# Patient Record
Sex: MALE | Race: WHITE | Employment: OTHER | URBAN - NONMETROPOLITAN AREA
[De-identification: names, ages, dates, MRNs, and addresses within clinical notes are randomized per-mention and may not be internally consistent; named-entity substitution may affect disease eponyms.]

---

## 2023-10-17 ENCOUNTER — TELEPHONE (OUTPATIENT)
Dept: CARDIOLOGY | Facility: CLINIC | Age: 88
End: 2023-10-17

## 2023-10-17 PROBLEM — Z79.01 ANTICOAGULATED: Status: ACTIVE | Noted: 2023-10-17

## 2023-10-17 PROBLEM — R94.39 ABNORMAL STRESS TEST: Status: ACTIVE | Noted: 2023-10-17

## 2023-10-17 PROBLEM — R06.00 DYSPNEA: Status: ACTIVE | Noted: 2023-10-17

## 2023-10-17 PROBLEM — I10 ESSENTIAL HYPERTENSION: Status: ACTIVE | Noted: 2023-10-17

## 2023-10-17 PROBLEM — D49.2 BONE TUMOR: Status: ACTIVE | Noted: 2023-10-17

## 2023-10-17 PROBLEM — G45.9 TIA (TRANSIENT ISCHEMIC ATTACK): Status: ACTIVE | Noted: 2023-10-17

## 2023-10-17 PROBLEM — I95.9 HYPOTENSION: Status: ACTIVE | Noted: 2023-10-17

## 2023-10-17 PROBLEM — I48.19 PERSISTENT ATRIAL FIBRILLATION (MULTI): Status: ACTIVE | Noted: 2023-10-17

## 2023-10-17 PROBLEM — E78.5 HYPERLIPIDEMIA: Status: ACTIVE | Noted: 2023-10-17

## 2023-10-17 RX ORDER — ASPIRIN 81 MG/1
81 TABLET ORAL DAILY
COMMUNITY

## 2023-10-17 RX ORDER — FUROSEMIDE 40 MG/1
1 TABLET ORAL DAILY
COMMUNITY
Start: 2022-07-15

## 2023-10-17 RX ORDER — BENAZEPRIL HYDROCHLORIDE 20 MG/1
0.5 TABLET ORAL DAILY
COMMUNITY

## 2023-10-17 RX ORDER — ASPIRIN 81 MG
1 TABLET, DELAYED RELEASE (ENTERIC COATED) ORAL DAILY
COMMUNITY

## 2023-10-17 RX ORDER — METOPROLOL SUCCINATE 25 MG/1
25 TABLET, EXTENDED RELEASE ORAL DAILY
COMMUNITY
End: 2023-11-29

## 2023-10-17 RX ORDER — POTASSIUM CHLORIDE 750 MG/1
1 TABLET, FILM COATED, EXTENDED RELEASE ORAL EVERY OTHER DAY
COMMUNITY

## 2023-10-17 RX ORDER — OXYBUTYNIN CHLORIDE 5 MG/1
1 TABLET ORAL 2 TIMES DAILY PRN
COMMUNITY

## 2023-10-17 RX ORDER — EPINEPHRINE 0.22MG
100 AEROSOL WITH ADAPTER (ML) INHALATION DAILY
COMMUNITY

## 2023-10-17 RX ORDER — TAMSULOSIN HYDROCHLORIDE 0.4 MG/1
1 CAPSULE ORAL DAILY
COMMUNITY

## 2023-10-17 RX ORDER — SIMVASTATIN 20 MG/1
1 TABLET, FILM COATED ORAL NIGHTLY
COMMUNITY

## 2023-10-17 RX ORDER — CHOLECALCIFEROL (VITAMIN D3) 50 MCG
1 TABLET ORAL DAILY
COMMUNITY

## 2023-10-17 NOTE — TELEPHONE ENCOUNTER
Shefali Hunt called ( [phone] Opt #[phone]) for POC. Patient  is scheduled for Total shoulder 11/1/2023 . Patient is taking Eliquis. Patient had a recent visit 9/13 to discuss abnormal stress.   To Dr. Kelsey Mclain MD

## 2023-10-23 NOTE — TELEPHONE ENCOUNTER
Daughter called back and she states they are not going to proceed with cardiac revascularization at this time. They understand the risk of the shoulder surgery but would like to move forward with the shoulder surgery       Please advise

## 2023-11-28 DIAGNOSIS — I48.19 PERSISTENT ATRIAL FIBRILLATION (MULTI): ICD-10-CM

## 2023-11-28 DIAGNOSIS — I10 ESSENTIAL HYPERTENSION: ICD-10-CM

## 2023-11-29 RX ORDER — METOPROLOL SUCCINATE 25 MG/1
25 TABLET, EXTENDED RELEASE ORAL DAILY
Qty: 90 TABLET | Refills: 3 | Status: SHIPPED | OUTPATIENT
Start: 2023-11-29

## 2023-11-29 RX ORDER — APIXABAN 5 MG/1
5 TABLET, FILM COATED ORAL 2 TIMES DAILY
Qty: 180 TABLET | Refills: 3 | Status: SHIPPED | OUTPATIENT
Start: 2023-11-29

## 2024-01-04 ENCOUNTER — HOSPITAL ENCOUNTER (OUTPATIENT)
Dept: HOSPITAL 101 - PT | Age: 89
LOS: 14 days | Discharge: HOME | End: 2024-01-18
Payer: MEDICARE

## 2024-01-04 DIAGNOSIS — Z96.611: Primary | ICD-10-CM

## 2024-01-04 DIAGNOSIS — M19.011: ICD-10-CM

## 2024-01-04 PROCEDURE — 97110 THERAPEUTIC EXERCISES: CPT

## 2024-01-04 PROCEDURE — 97163 PT EVAL HIGH COMPLEX 45 MIN: CPT

## 2024-03-13 ENCOUNTER — OFFICE VISIT (OUTPATIENT)
Dept: CARDIOLOGY | Facility: CLINIC | Age: 89
End: 2024-03-13
Payer: MEDICARE

## 2024-03-13 VITALS
BODY MASS INDEX: 34.41 KG/M2 | HEART RATE: 60 BPM | WEIGHT: 233 LBS | SYSTOLIC BLOOD PRESSURE: 100 MMHG | DIASTOLIC BLOOD PRESSURE: 68 MMHG

## 2024-03-13 DIAGNOSIS — G45.9 TIA (TRANSIENT ISCHEMIC ATTACK): ICD-10-CM

## 2024-03-13 DIAGNOSIS — Z79.01 ANTICOAGULATED: ICD-10-CM

## 2024-03-13 DIAGNOSIS — E78.2 MIXED HYPERLIPIDEMIA: ICD-10-CM

## 2024-03-13 DIAGNOSIS — I48.19 PERSISTENT ATRIAL FIBRILLATION (MULTI): Primary | ICD-10-CM

## 2024-03-13 DIAGNOSIS — Z78.9 NEVER SMOKED CIGARETTES: ICD-10-CM

## 2024-03-13 DIAGNOSIS — R94.39 ABNORMAL STRESS TEST: ICD-10-CM

## 2024-03-13 DIAGNOSIS — I10 ESSENTIAL HYPERTENSION: ICD-10-CM

## 2024-03-13 DIAGNOSIS — R06.09 DYSPNEA ON EXERTION: ICD-10-CM

## 2024-03-13 PROCEDURE — 1159F MED LIST DOCD IN RCRD: CPT | Performed by: INTERNAL MEDICINE

## 2024-03-13 PROCEDURE — 3074F SYST BP LT 130 MM HG: CPT | Performed by: INTERNAL MEDICINE

## 2024-03-13 PROCEDURE — 3078F DIAST BP <80 MM HG: CPT | Performed by: INTERNAL MEDICINE

## 2024-03-13 PROCEDURE — 1160F RVW MEDS BY RX/DR IN RCRD: CPT | Performed by: INTERNAL MEDICINE

## 2024-03-13 PROCEDURE — 99213 OFFICE O/P EST LOW 20 MIN: CPT | Performed by: INTERNAL MEDICINE

## 2024-03-13 RX ORDER — MELATONIN 5 MG
1 CAPSULE ORAL NIGHTLY
COMMUNITY

## 2024-03-13 NOTE — LETTER
March 13, 2024     Biju Frias DO  [address]  [facility] [id number]    Patient: Stephanie Ann   YOB: 1935   Date of Visit: 3/13/2024       Dear Dr. Biju Frias, :    Thank you for referring Stephanie Ann to me for evaluation. Below are my notes for this consultation.  If you have questions, please do not hesitate to call me. I look forward to following your patient along with you.       Sincerely,     Kelsey Mclain MD      CC: No Recipients  ______________________________________________________________________________________    Subjective   Stephanie Ann is a 88 y.o. male       Chief Complaint    Follow-up          HPI       Patient is here for follow-up continue management for history of persistent atrial fibrillation probably becoming permanent elected for heart rate control on long-term anticoagulation.  History of hypertension hyperlipidemia.  Since last time I saw him he underwent shoulder surgery without any cardiac issues or complication.  He had no cardiac complaint.  He is compliant with his medication.    ASSESSMENT:      1. Persistent/recurrent atrial fibrillation. Last time we discussed treatment option the patient elected for heart rate control on long-term anticoagulation  2. Prior evaluation for transient ischemic attack. His workup was benign. ,   3.  Abnormal stress test showed anteroapical wall myocardial ischemia elected for conservative management due to lack of chest pain and his age and personal preference  4. Hypertension, controlled.  5. Hyperlipidemia no recent lab available to review  6. Obesity, with no weight changes.  7. Mild lower extremity edema likely to be gravitational due to sitting in chair most of the time his previous echo was normal  8. Remote history of syncope with no recurrence  9.  Recent shoulder surgery without any cardiac issues or complication        RECOMMENDATION:      1.The patient was counseled regarding losing  weight,exercise, and risk factor adjustment.   2. We discussed treatment option. The patient continues to be in preference for heart rate control and long-term anticoagulation strategy  3. I asked him to monitor his weight and notify me if he develop any weight gain or edema  4.  I will see him back in the office in 6 months we will plan to repeat his EKG  5. The patient was advised to forward copy of his lab work when it is done I reviewed with him his recent lab work   Review of Systems   All other systems reviewed and are negative.           Vitals:    03/13/24 1345   BP: 100/68   BP Location: Left arm   Patient Position: Sitting   Pulse: 60   Weight: 106 kg (233 lb)        Objective   Physical Exam  Constitutional:       Appearance: Normal appearance.   HENT:      Nose: Nose normal.   Neck:      Vascular: No carotid bruit.   Cardiovascular:      Rate and Rhythm: Normal rate. Rhythm irregularly irregular.      Pulses: Normal pulses.      Heart sounds: Normal heart sounds.   Pulmonary:      Effort: Pulmonary effort is normal.   Abdominal:      General: Bowel sounds are normal.      Palpations: Abdomen is soft.   Musculoskeletal:         General: Normal range of motion.      Cervical back: Normal range of motion.      Right lower leg: No edema.      Left lower leg: No edema.   Skin:     General: Skin is warm and dry.   Neurological:      General: No focal deficit present.      Mental Status: He is alert.   Psychiatric:         Mood and Affect: Mood normal.         Behavior: Behavior normal.         Thought Content: Thought content normal.         Judgment: Judgment normal.         Allergies  Naproxen     Current Medications    Current Outpatient Medications:   •  acetaminophen/diphenhydramine (DIPHENHYDRAMINE-ACETAMINOPHEN ORAL), Take 1 tablet by mouth once daily at bedtime., Disp: , Rfl:   •  aspirin 81 mg EC tablet, Take 1 tablet (81 mg) by mouth once daily., Disp: , Rfl:   •  BACILLUS COAGULANS-INULIN ORAL, Take  1 capsule by mouth once daily at bedtime., Disp: , Rfl:   •  benazepril (Lotensin) 20 mg tablet, Take 0.5 tablets (10 mg) by mouth once daily., Disp: , Rfl:   •  cholecalciferol (Vitamin D-3) 50 MCG (2000 UT) tablet, Take 1 tablet (2,000 Units) by mouth once daily., Disp: , Rfl:   •  coenzyme Q-10 (Co Q-10) 100 mg capsule, Take 1 capsule (100 mg) by mouth once daily., Disp: , Rfl:   •  docusate sodium (Colace) 100 mg tablet, Take 1 tablet (100 mg) by mouth once daily., Disp: , Rfl:   •  Eliquis 5 mg tablet, Take 1 tablet by mouth twice daily, Disp: 180 tablet, Rfl: 3  •  furosemide (Lasix) 40 mg tablet, Take 1 tablet (40 mg) by mouth once daily., Disp: , Rfl:   •  melatonin 5 mg capsule, Take 1 capsule (5 mg) by mouth once daily at bedtime., Disp: , Rfl:   •  metoprolol succinate XL (Toprol-XL) 25 mg 24 hr tablet, Take 1 tablet by mouth once daily, Disp: 90 tablet, Rfl: 3  •  oxybutynin (Ditropan) 5 mg tablet, Take 1 tablet (5 mg) by mouth 2 times a day as needed., Disp: , Rfl:   •  potassium chloride CR 10 mEq ER tablet, Take 1 tablet (10 mEq) by mouth every other day., Disp: , Rfl:   •  simvastatin (Zocor) 20 mg tablet, Take 1 tablet (20 mg) by mouth once daily at bedtime., Disp: , Rfl:   •  tamsulosin (Flomax) 0.4 mg 24 hr capsule, Take 1 capsule (0.4 mg) by mouth once daily., Disp: , Rfl:                      Assessment/Plan   1. Persistent atrial fibrillation (CMS/HCC)  Follow Up In Cardiology      2. Essential hypertension        3. Abnormal stress test        4. Mixed hyperlipidemia        5. Anticoagulated        6. TIA (transient ischemic attack)        7. Dyspnea on exertion        8. BMI 34.0-34.9,adult        9. Never smoked cigarettes                 Scribe Attestation  By signing my name below, Leigh Ann MONSIVAIS LPN, Scribe   attest that this documentation has been prepared under the direction and in the presence of Kelsey Mclain MD.     Provider Attestation - Scribe documentation    All medical  record entries made by the Scribe were at my direction and personally dictated by me. I have reviewed the chart and agree that the record accurately reflects my personal performance of the history, physical exam, discussion and plan.

## 2024-03-13 NOTE — PATIENT INSTRUCTIONS
Please bring all medicines, vitamins, and herbal supplements with you when you come to the office.    Prescriptions will not be filled unless you are compliant with your follow up appointments or have a follow up appointment scheduled as per instruction of your physician. Refills should be requested at the time of your visit.     BMI was above normal measurement. Current weight: 106 kg (233 lb)  Weight change since last visit (-) denotes wt loss -8 lbs   Weight loss needed to achieve BMI 25:   Lbs  Weight loss needed to achieve BMI 30:   Lbs  Provided instructions on dietary changes  Provided instructions on exercise.

## 2024-03-13 NOTE — PROGRESS NOTES
Subjective   Stephanie Ann is a 88 y.o. male       Chief Complaint    Follow-up          HPI       Patient is here for follow-up continue management for history of persistent atrial fibrillation probably becoming permanent elected for heart rate control on long-term anticoagulation.  History of hypertension hyperlipidemia.  Since last time I saw him he underwent shoulder surgery without any cardiac issues or complication.  He had no cardiac complaint.  He is compliant with his medication.    ASSESSMENT:      1. Persistent/recurrent atrial fibrillation. Last time we discussed treatment option the patient elected for heart rate control on long-term anticoagulation  2. Prior evaluation for transient ischemic attack. His workup was benign. ,   3.  Abnormal stress test showed anteroapical wall myocardial ischemia elected for conservative management due to lack of chest pain and his age and personal preference  4. Hypertension, controlled.  5. Hyperlipidemia no recent lab available to review  6. Obesity, with no weight changes.  7. Mild lower extremity edema likely to be gravitational due to sitting in chair most of the time his previous echo was normal  8. Remote history of syncope with no recurrence  9.  Recent shoulder surgery without any cardiac issues or complication        RECOMMENDATION:      1.The patient was counseled regarding losing weight,exercise, and risk factor adjustment.   2. We discussed treatment option. The patient continues to be in preference for heart rate control and long-term anticoagulation strategy  3. I asked him to monitor his weight and notify me if he develop any weight gain or edema  4.  I will see him back in the office in 6 months we will plan to repeat his EKG  5. The patient was advised to forward copy of his lab work when it is done I reviewed with him his recent lab work   Review of Systems   All other systems reviewed and are negative.           Vitals:    03/13/24 1345   BP: 100/68    BP Location: Left arm   Patient Position: Sitting   Pulse: 60   Weight: 106 kg (233 lb)        Objective   Physical Exam  Constitutional:       Appearance: Normal appearance.   HENT:      Nose: Nose normal.   Neck:      Vascular: No carotid bruit.   Cardiovascular:      Rate and Rhythm: Normal rate. Rhythm irregularly irregular.      Pulses: Normal pulses.      Heart sounds: Normal heart sounds.   Pulmonary:      Effort: Pulmonary effort is normal.   Abdominal:      General: Bowel sounds are normal.      Palpations: Abdomen is soft.   Musculoskeletal:         General: Normal range of motion.      Cervical back: Normal range of motion.      Right lower leg: No edema.      Left lower leg: No edema.   Skin:     General: Skin is warm and dry.   Neurological:      General: No focal deficit present.      Mental Status: He is alert.   Psychiatric:         Mood and Affect: Mood normal.         Behavior: Behavior normal.         Thought Content: Thought content normal.         Judgment: Judgment normal.         Allergies  Naproxen     Current Medications    Current Outpatient Medications:     acetaminophen/diphenhydramine (DIPHENHYDRAMINE-ACETAMINOPHEN ORAL), Take 1 tablet by mouth once daily at bedtime., Disp: , Rfl:     aspirin 81 mg EC tablet, Take 1 tablet (81 mg) by mouth once daily., Disp: , Rfl:     BACILLUS COAGULANS-INULIN ORAL, Take 1 capsule by mouth once daily at bedtime., Disp: , Rfl:     benazepril (Lotensin) 20 mg tablet, Take 0.5 tablets (10 mg) by mouth once daily., Disp: , Rfl:     cholecalciferol (Vitamin D-3) 50 MCG (2000 UT) tablet, Take 1 tablet (2,000 Units) by mouth once daily., Disp: , Rfl:     coenzyme Q-10 (Co Q-10) 100 mg capsule, Take 1 capsule (100 mg) by mouth once daily., Disp: , Rfl:     docusate sodium (Colace) 100 mg tablet, Take 1 tablet (100 mg) by mouth once daily., Disp: , Rfl:     Eliquis 5 mg tablet, Take 1 tablet by mouth twice daily, Disp: 180 tablet, Rfl: 3    furosemide (Lasix)  40 mg tablet, Take 1 tablet (40 mg) by mouth once daily., Disp: , Rfl:     melatonin 5 mg capsule, Take 1 capsule (5 mg) by mouth once daily at bedtime., Disp: , Rfl:     metoprolol succinate XL (Toprol-XL) 25 mg 24 hr tablet, Take 1 tablet by mouth once daily, Disp: 90 tablet, Rfl: 3    oxybutynin (Ditropan) 5 mg tablet, Take 1 tablet (5 mg) by mouth 2 times a day as needed., Disp: , Rfl:     potassium chloride CR 10 mEq ER tablet, Take 1 tablet (10 mEq) by mouth every other day., Disp: , Rfl:     simvastatin (Zocor) 20 mg tablet, Take 1 tablet (20 mg) by mouth once daily at bedtime., Disp: , Rfl:     tamsulosin (Flomax) 0.4 mg 24 hr capsule, Take 1 capsule (0.4 mg) by mouth once daily., Disp: , Rfl:                      Assessment/Plan   1. Persistent atrial fibrillation (CMS/HCC)  Follow Up In Cardiology      2. Essential hypertension        3. Abnormal stress test        4. Mixed hyperlipidemia        5. Anticoagulated        6. TIA (transient ischemic attack)        7. Dyspnea on exertion        8. BMI 34.0-34.9,adult        9. Never smoked cigarettes                 Scribe Attestation  By signing my name below, ILeigh Ann LPN, Scribe   attest that this documentation has been prepared under the direction and in the presence of Kelsey Mclain MD.     Provider Attestation - Scribe documentation    All medical record entries made by the Scribe were at my direction and personally dictated by me. I have reviewed the chart and agree that the record accurately reflects my personal performance of the history, physical exam, discussion and plan.

## 2024-08-09 DIAGNOSIS — I48.19 PERSISTENT ATRIAL FIBRILLATION (MULTI): ICD-10-CM

## 2024-08-09 DIAGNOSIS — R06.09 DYSPNEA ON EXERTION: ICD-10-CM

## 2024-08-09 DIAGNOSIS — I10 ESSENTIAL HYPERTENSION: ICD-10-CM

## 2024-08-10 RX ORDER — FUROSEMIDE 40 MG/1
40 TABLET ORAL DAILY
Qty: 90 TABLET | Refills: 0 | Status: SHIPPED | OUTPATIENT
Start: 2024-08-10

## 2024-08-16 DIAGNOSIS — I10 ESSENTIAL HYPERTENSION: ICD-10-CM

## 2024-08-16 RX ORDER — POTASSIUM CHLORIDE 750 MG/1
10 TABLET, FILM COATED, EXTENDED RELEASE ORAL EVERY OTHER DAY
Qty: 45 TABLET | Refills: 3 | Status: SHIPPED | OUTPATIENT
Start: 2024-08-16 | End: 2025-08-16

## 2024-09-11 ENCOUNTER — APPOINTMENT (OUTPATIENT)
Dept: CARDIOLOGY | Facility: CLINIC | Age: 89
End: 2024-09-11
Payer: MEDICARE

## 2024-10-28 ENCOUNTER — APPOINTMENT (OUTPATIENT)
Dept: CARDIOLOGY | Facility: CLINIC | Age: 89
End: 2024-10-28
Payer: MEDICARE

## 2024-10-28 VITALS
DIASTOLIC BLOOD PRESSURE: 76 MMHG | HEIGHT: 69 IN | HEART RATE: 76 BPM | BODY MASS INDEX: 34.18 KG/M2 | SYSTOLIC BLOOD PRESSURE: 112 MMHG | WEIGHT: 230.8 LBS

## 2024-10-28 DIAGNOSIS — R94.39 ABNORMAL STRESS TEST: ICD-10-CM

## 2024-10-28 DIAGNOSIS — I48.19 PERSISTENT ATRIAL FIBRILLATION (MULTI): Primary | ICD-10-CM

## 2024-10-28 DIAGNOSIS — G45.9 TIA (TRANSIENT ISCHEMIC ATTACK): ICD-10-CM

## 2024-10-28 DIAGNOSIS — E78.2 MIXED HYPERLIPIDEMIA: ICD-10-CM

## 2024-10-28 DIAGNOSIS — I10 ESSENTIAL HYPERTENSION: ICD-10-CM

## 2024-10-28 DIAGNOSIS — Z79.01 ANTICOAGULATED: ICD-10-CM

## 2024-10-28 DIAGNOSIS — Z78.9 NEVER SMOKED CIGARETTES: ICD-10-CM

## 2024-10-28 DIAGNOSIS — R06.09 DYSPNEA ON EXERTION: ICD-10-CM

## 2024-10-28 PROBLEM — I95.9 HYPOTENSION: Status: RESOLVED | Noted: 2023-10-17 | Resolved: 2024-10-28

## 2024-10-28 PROCEDURE — 1159F MED LIST DOCD IN RCRD: CPT | Performed by: INTERNAL MEDICINE

## 2024-10-28 PROCEDURE — 3078F DIAST BP <80 MM HG: CPT | Performed by: INTERNAL MEDICINE

## 2024-10-28 PROCEDURE — 99214 OFFICE O/P EST MOD 30 MIN: CPT | Performed by: INTERNAL MEDICINE

## 2024-10-28 PROCEDURE — 3074F SYST BP LT 130 MM HG: CPT | Performed by: INTERNAL MEDICINE

## 2024-10-28 PROCEDURE — G2211 COMPLEX E/M VISIT ADD ON: HCPCS | Performed by: INTERNAL MEDICINE

## 2024-10-28 RX ORDER — VIBEGRON 75 MG/1
1 TABLET, FILM COATED ORAL
COMMUNITY
Start: 2024-10-09

## 2024-11-21 DIAGNOSIS — I10 ESSENTIAL HYPERTENSION: ICD-10-CM

## 2024-11-21 DIAGNOSIS — I48.19 PERSISTENT ATRIAL FIBRILLATION (MULTI): ICD-10-CM

## 2024-11-22 RX ORDER — METOPROLOL SUCCINATE 25 MG/1
25 TABLET, EXTENDED RELEASE ORAL DAILY
Qty: 90 TABLET | Refills: 3 | Status: SHIPPED | OUTPATIENT
Start: 2024-11-22 | End: 2025-11-22

## 2024-12-03 DIAGNOSIS — I10 ESSENTIAL HYPERTENSION: ICD-10-CM

## 2024-12-03 DIAGNOSIS — I48.19 PERSISTENT ATRIAL FIBRILLATION (MULTI): ICD-10-CM

## 2024-12-13 DIAGNOSIS — I48.19 PERSISTENT ATRIAL FIBRILLATION (MULTI): ICD-10-CM

## 2024-12-13 DIAGNOSIS — I10 ESSENTIAL HYPERTENSION: ICD-10-CM

## 2024-12-13 DIAGNOSIS — R06.09 DYSPNEA ON EXERTION: ICD-10-CM

## 2024-12-13 RX ORDER — FUROSEMIDE 40 MG/1
40 TABLET ORAL DAILY
Qty: 90 TABLET | Refills: 3 | Status: SHIPPED | OUTPATIENT
Start: 2024-12-13 | End: 2025-12-13

## 2024-12-16 DIAGNOSIS — I10 ESSENTIAL HYPERTENSION: ICD-10-CM

## 2024-12-16 RX ORDER — FUROSEMIDE 40 MG/1
40 TABLET ORAL DAILY
Qty: 90 TABLET | Refills: 3 | Status: CANCELLED | OUTPATIENT
Start: 2024-12-16 | End: 2025-12-16

## 2025-01-31 ENCOUNTER — TELEPHONE (OUTPATIENT)
Dept: CARDIOLOGY | Facility: CLINIC | Age: OVER 89
End: 2025-01-31
Payer: MEDICARE

## 2025-01-31 NOTE — TELEPHONE ENCOUNTER
Daughter phoned that over the past 2 weeks patient has been complaining of dizziness that comes on at different times, states could be just sitting and feels dizzy. Daughter has checked BP and it was 108/72 but the monitor states he is out of rhythm. States if he sits awhile after he gets dizzy, he will be fine. Please advise.    To Dr. Kelsey Mclain MD

## 2025-07-29 DIAGNOSIS — I10 ESSENTIAL HYPERTENSION: ICD-10-CM

## 2025-07-29 RX ORDER — POTASSIUM CHLORIDE 750 MG/1
10 TABLET, FILM COATED, EXTENDED RELEASE ORAL EVERY OTHER DAY
Qty: 45 TABLET | Refills: 3 | Status: SHIPPED | OUTPATIENT
Start: 2025-07-29 | End: 2026-07-29

## 2025-08-08 ENCOUNTER — APPOINTMENT (OUTPATIENT)
Dept: CARDIOLOGY | Facility: CLINIC | Age: OVER 89
End: 2025-08-08
Payer: MEDICARE

## 2025-08-11 ENCOUNTER — APPOINTMENT (OUTPATIENT)
Dept: CARDIOLOGY | Facility: CLINIC | Age: OVER 89
End: 2025-08-11
Payer: MEDICARE

## 2025-08-11 VITALS
HEART RATE: 60 BPM | WEIGHT: 222 LBS | BODY MASS INDEX: 32.88 KG/M2 | DIASTOLIC BLOOD PRESSURE: 54 MMHG | SYSTOLIC BLOOD PRESSURE: 90 MMHG | HEIGHT: 69 IN

## 2025-08-11 DIAGNOSIS — R06.09 DYSPNEA ON EXERTION: ICD-10-CM

## 2025-08-11 DIAGNOSIS — Z79.01 ANTICOAGULATED: ICD-10-CM

## 2025-08-11 DIAGNOSIS — I10 ESSENTIAL HYPERTENSION: ICD-10-CM

## 2025-08-11 DIAGNOSIS — E78.2 MIXED HYPERLIPIDEMIA: ICD-10-CM

## 2025-08-11 DIAGNOSIS — I48.11 LONGSTANDING PERSISTENT ATRIAL FIBRILLATION (MULTI): Primary | ICD-10-CM

## 2025-08-11 DIAGNOSIS — R94.39 ABNORMAL STRESS TEST: ICD-10-CM

## 2025-08-11 DIAGNOSIS — Z78.9 NEVER SMOKED CIGARETTES: ICD-10-CM

## 2025-08-11 DIAGNOSIS — G45.9 TIA (TRANSIENT ISCHEMIC ATTACK): ICD-10-CM

## 2025-08-11 PROCEDURE — 99214 OFFICE O/P EST MOD 30 MIN: CPT | Performed by: INTERNAL MEDICINE

## 2025-08-11 PROCEDURE — 1036F TOBACCO NON-USER: CPT | Performed by: INTERNAL MEDICINE

## 2025-08-11 PROCEDURE — 93000 ELECTROCARDIOGRAM COMPLETE: CPT | Performed by: INTERNAL MEDICINE

## 2025-08-11 PROCEDURE — 3078F DIAST BP <80 MM HG: CPT | Performed by: INTERNAL MEDICINE

## 2025-08-11 PROCEDURE — 3074F SYST BP LT 130 MM HG: CPT | Performed by: INTERNAL MEDICINE

## 2025-08-11 PROCEDURE — 1159F MED LIST DOCD IN RCRD: CPT | Performed by: INTERNAL MEDICINE

## 2025-08-11 PROCEDURE — 1160F RVW MEDS BY RX/DR IN RCRD: CPT | Performed by: INTERNAL MEDICINE

## 2025-08-11 RX ORDER — FUROSEMIDE 20 MG/1
20 TABLET ORAL DAILY
Qty: 90 TABLET | Refills: 3 | Status: SHIPPED | OUTPATIENT
Start: 2025-08-11 | End: 2026-08-11

## 2025-08-11 RX ORDER — METOPROLOL SUCCINATE 25 MG/1
12.5 TABLET, EXTENDED RELEASE ORAL DAILY
Qty: 45 TABLET | Refills: 3 | Status: SHIPPED | OUTPATIENT
Start: 2025-08-11 | End: 2026-08-11

## 2025-08-11 RX ORDER — DOCUSATE SODIUM 100 MG/1
100 CAPSULE, LIQUID FILLED ORAL 3 TIMES DAILY
COMMUNITY

## 2025-08-11 RX ORDER — ASPIRIN 81 MG/1
81 TABLET ORAL 2 TIMES WEEKLY
COMMUNITY

## 2025-08-11 RX ORDER — CETIRIZINE HYDROCHLORIDE 10 MG/1
10 TABLET ORAL DAILY
COMMUNITY

## 2025-08-11 ASSESSMENT — ENCOUNTER SYMPTOMS
LIGHT-HEADEDNESS: 1
DYSPNEA ON EXERTION: 1

## 2026-03-24 ENCOUNTER — APPOINTMENT (OUTPATIENT)
Dept: CARDIOLOGY | Facility: CLINIC | Age: OVER 89
End: 2026-03-24
Payer: MEDICARE